# Patient Record
Sex: FEMALE | Race: WHITE | NOT HISPANIC OR LATINO | Employment: FULL TIME | URBAN - METROPOLITAN AREA
[De-identification: names, ages, dates, MRNs, and addresses within clinical notes are randomized per-mention and may not be internally consistent; named-entity substitution may affect disease eponyms.]

---

## 2022-02-18 LAB
HUMAN PAPILLOMAVIRUS (HPV): NORMAL
PAP RECOMMENDATION EXT: NORMAL
PAP SMEAR: NORMAL

## 2022-04-10 ENCOUNTER — HISTORICAL (OUTPATIENT)
Dept: ADMINISTRATIVE | Facility: HOSPITAL | Age: 32
End: 2022-04-10

## 2022-04-26 VITALS
WEIGHT: 130.94 LBS | BODY MASS INDEX: 19.84 KG/M2 | SYSTOLIC BLOOD PRESSURE: 110 MMHG | DIASTOLIC BLOOD PRESSURE: 60 MMHG | OXYGEN SATURATION: 99 % | HEIGHT: 68 IN

## 2024-03-09 PROBLEM — G43.009 MIGRAINE WITHOUT AURA AND WITHOUT STATUS MIGRAINOSUS, NOT INTRACTABLE: Status: ACTIVE | Noted: 2024-03-09

## 2024-03-09 PROBLEM — F90.0 ATTENTION DEFICIT HYPERACTIVITY DISORDER (ADHD), PREDOMINANTLY INATTENTIVE TYPE: Status: ACTIVE | Noted: 2019-07-08

## 2024-03-09 NOTE — PROGRESS NOTES
Patient ID: [id number]     Chief Complaint: Establish Care        HPI:     Jory Sosa is a 33 y.o. female here today Patient here to establish with PCP/Wellness visit- referred to clinic by  insurance program.  Interested in obtaining baseline labs.  Is interested in obtaining hormone levels.   1) Is managed by psychiatrist-for attention deficit disorder, anxiety, history of opioid abuse-doing well.   2) Has been evaluated by multiple Dermatologist regarding treatment for acne rosacea-continues to have significant problems-is a stressor.    Past Medical History:   Diagnosis Date    Anxiety 2013    Attention deficit hyperactivity disorder (ADHD), predominantly inattentive type 2019    +psych testing    Migraine without aura and without status migrainosus, not intractable 2024        History reviewed. No pertinent surgical history.     Social History     Tobacco Use    Smoking status: Never    Smokeless tobacco: Never   Substance Use Topics    Drug use: Never        Social History     Socioeconomic History    Marital status:     Number of children: 2        Current Outpatient Medications   Medication Instructions    azelaic acid (AZELEX) 15 % gel SMARTSI Gram(s) Topical Every Morning    buprenorphine HCL (SUBUTEX) 4 mg, Sublingual    buPROPion (WELLBUTRIN XL) 150 mg, Oral    clindamycin phosphate 1% (CLINDAGEL) 1 % gel Topical (Top), 2 times daily    cloNIDine (CATAPRES) 0.1-0.2 mg, Oral, Nightly PRN    dextroamphetamine-amphetamine (ADDERALL) 20 mg tablet 1 tablet, Oral, 2 times daily    levonorgestreL (MIRENA) 52 mg, Intrauterine    LINZESS 290 mcg, Oral    metronidazole 1% (METROGEL) 1 % Gel 1 application , Topical (Top)    mupirocin (BACTROBAN) 2 % ointment Topical (Top)    spironolactone (ALDACTONE) 50 mg, Oral, 2 times daily    SSD 1 % cream Topical (Top), 2 times daily       Review of patient's allergies indicates:  No Known Allergies     Patient Care Team:  Shirlene Eastman MD as  "PCP - General (Family Medicine)  Eddi Smith MD (Dermatology)  Sharon Kathleen PA as Physician Assistant (General Surgery)  Blayne Lugo MD (Dermatology)  Anna Marie Chiu MD as Consulting Physician (Obstetrics and Gynecology)     Subjective:     Review of Systems    12 point review of systems conducted, negative except as stated in the history of present illness. See HPI for details.      Objective:     Visit Vitals  /72   Pulse 98   Temp 97.9 °F (36.6 °C)   Ht 5' 7" (1.702 m)   Wt 54.3 kg (119 lb 9.6 oz)   SpO2 96%   BMI 18.73 kg/m²       Physical Exam    General: Alert and oriented, No acute distress.  Head: Normocephalic, Atraumatic, Acne.  Eye: Pupils are equal, round and reactive to light, Extraocular movements are intact, Sclera non-icteric.  Ears/Nose/Throat: Normal, Mucosa moist,Clear.  Neck/Thyroid: Supple, Non-tender, No palpable thyromegaly or thyroid nodule, No lymphadenopathy, No JVD, Full range of motion.  Respiratory: Clear to auscultation bilaterally; No wheezes, rales or rhonchi,Non-labored respirations, Symmetrical chest wall expansion.  Cardiovascular: Regular rate and rhythm, S1/S2 normal, No murmurs, rubs or gallops.  Gastrointestinal: Soft, Non-tender, Non-distended, Normal bowel sounds, No palpable organomegaly.  Musculoskeletal: Normal range of motion.  Integumentary: Warm, Dry, Intact, No suspicious lesions or rashes.  Extremities: No clubbing, cyanosis or edema  Neurologic: No focal deficits, Cranial Nerves II-XII are grossly intact, Motor strength normal upper and lower extremities, Sensory exam intact.  Psychiatric: Normal interaction, Coherent speech, Euthymic mood, Appropriate affect       Assessment:       ICD-10-CM ICD-9-CM   1. Wellness examination  Z00.00 V70.0   2. Attention deficit hyperactivity disorder (ADHD), predominantly inattentive type  F90.0 314.00   3. Anxiety  F41.9 300.00   4. Migraine without aura and without status migrainosus, not " "intractable  G43.009 346.10   5. Acne, unspecified acne type  L70.9 706.1   6. Fatigue, unspecified type  R53.83 780.79        Plan:       Cervical Cancer Screening - Pap Up to date     Breast Cancer Screening - Mammogram to start at age 40    Colon Cancer Screening - Colon cancer screening to start at age 45     Eye Exam - Recommend annually.    Dental Exam - Recommend biannual exams.     Vaccinations - Immunization guidelines reviewed with the patient.  Encourage patient to prevent disease through immunizations.      There is no immunization history on file for this patient.     1. Wellness examination  Wellness: Five Rules Reviewed: Water/Nutrition-Real Food, Limit Fast Food/ Exercise 20-30 minutes most days of the week/ Mental health- "Is your work a calling or paycheck" Define 'What is your purpose-what matters to you' Stress- Is an Individual Response- Therefore Can be Controlled by the Individual. Meditation/ Immunizations/Multivitamin use-Vitamin D3/ Screenings -patient would like referral to new gynecologist.  - CBC Auto Differential; Future  - Comprehensive Metabolic Panel; Future  - Lipid Panel; Future  - TSH; Future  - Hemoglobin A1C; Future  - Urinalysis; Future  - Hepatitis C Antibody; Future  - HIV 1/2 Ag/Ab (4th Gen); Future  - Ambulatory referral/consult to Obstetrics / Gynecology; Future    2. Attention deficit hyperactivity disorder (ADHD), predominantly inattentive type  Patient is comanage with psychiatrist -no acute modifications or recommendations at this time.     3. Anxiety  Patient is comanage with psychiatrist -no acute modifications or recommendations at this time.     4. Migraine without aura and without status migrainosus, not intractable  Patient is currently stable.  No acute modifications recommended.  Continue to monitor    5. Acne, unspecified acne type  Patient referred to dermatologist for further treatment options.   - Ambulatory referral/consult to Dermatology; Future    6. " Fatigue, unspecified type  Check studies management based upon results.  Pathophysiology/treatment/dangers discussed.   - Progesterone; Future  - Luteinizing Hormone; Future  - Follicle Stimulating Hormone; Future  - Estradiol; Future      The patient's Health Maintenance was reviewed and the following appears to be due at this time:   Health Maintenance Due   Topic Date Due    Hepatitis C Screening  Never done    Cervical Cancer Screening  Never done    Lipid Panel  Never done    COVID-19 Vaccine (1) Never done    HIV Screening  Never done    TETANUS VACCINE  Never done    Influenza Vaccine (1) 09/01/2023         Follow up in about 2 weeks (around 3/28/2024). In addition to their scheduled follow up, the patient has also been instructed to follow up on as needed basis.     Future Appointments   Date Time Provider Department Center   3/27/2024 11:15 AM [facility]

## 2024-03-14 ENCOUNTER — OFFICE VISIT (OUTPATIENT)
Dept: FAMILY MEDICINE | Facility: CLINIC | Age: 34
End: 2024-03-14
Payer: COMMERCIAL

## 2024-03-14 VITALS
WEIGHT: 119.63 LBS | HEIGHT: 67 IN | HEART RATE: 98 BPM | TEMPERATURE: 98 F | DIASTOLIC BLOOD PRESSURE: 72 MMHG | OXYGEN SATURATION: 96 % | BODY MASS INDEX: 18.78 KG/M2 | SYSTOLIC BLOOD PRESSURE: 102 MMHG

## 2024-03-14 DIAGNOSIS — R53.83 FATIGUE, UNSPECIFIED TYPE: ICD-10-CM

## 2024-03-14 DIAGNOSIS — G43.009 MIGRAINE WITHOUT AURA AND WITHOUT STATUS MIGRAINOSUS, NOT INTRACTABLE: ICD-10-CM

## 2024-03-14 DIAGNOSIS — F41.9 ANXIETY: ICD-10-CM

## 2024-03-14 DIAGNOSIS — Z00.00 WELLNESS EXAMINATION: Primary | ICD-10-CM

## 2024-03-14 DIAGNOSIS — L70.9 ACNE, UNSPECIFIED ACNE TYPE: ICD-10-CM

## 2024-03-14 DIAGNOSIS — F90.0 ATTENTION DEFICIT HYPERACTIVITY DISORDER (ADHD), PREDOMINANTLY INATTENTIVE TYPE: ICD-10-CM

## 2024-03-14 PROCEDURE — 1159F MED LIST DOCD IN RCRD: CPT | Mod: CPTII,,, | Performed by: FAMILY MEDICINE

## 2024-03-14 PROCEDURE — 1160F RVW MEDS BY RX/DR IN RCRD: CPT | Mod: CPTII,,, | Performed by: FAMILY MEDICINE

## 2024-03-14 PROCEDURE — 3078F DIAST BP <80 MM HG: CPT | Mod: CPTII,,, | Performed by: FAMILY MEDICINE

## 2024-03-14 PROCEDURE — 3008F BODY MASS INDEX DOCD: CPT | Mod: CPTII,,, | Performed by: FAMILY MEDICINE

## 2024-03-14 PROCEDURE — 99385 PREV VISIT NEW AGE 18-39: CPT | Mod: ,,, | Performed by: FAMILY MEDICINE

## 2024-03-14 PROCEDURE — 3074F SYST BP LT 130 MM HG: CPT | Mod: CPTII,,, | Performed by: FAMILY MEDICINE

## 2024-03-14 RX ORDER — SPIRONOLACTONE 50 MG/1
50 TABLET, FILM COATED ORAL 2 TIMES DAILY
COMMUNITY
Start: 2024-02-20

## 2024-03-14 RX ORDER — BUPRENORPHINE 2 MG/1
4 TABLET SUBLINGUAL
COMMUNITY
Start: 2024-02-27

## 2024-03-14 RX ORDER — LINACLOTIDE 290 UG/1
290 CAPSULE, GELATIN COATED ORAL
COMMUNITY
Start: 2024-02-05

## 2024-03-14 RX ORDER — CLONIDINE HYDROCHLORIDE 0.1 MG/1
0.1-0.2 TABLET ORAL NIGHTLY PRN
COMMUNITY
Start: 2024-02-22

## 2024-03-14 RX ORDER — MUPIROCIN 20 MG/G
OINTMENT TOPICAL
COMMUNITY
Start: 2024-03-03

## 2024-03-14 RX ORDER — CLINDAMYCIN PHOSPHATE 10 MG/G
GEL TOPICAL 2 TIMES DAILY
COMMUNITY
Start: 2023-12-26

## 2024-03-14 RX ORDER — AZELAIC ACID 0.15 G/G
GEL TOPICAL
COMMUNITY
Start: 2024-03-03

## 2024-03-14 RX ORDER — SILVER SULFADIAZINE 10 G/1000G
CREAM TOPICAL 2 TIMES DAILY
COMMUNITY
Start: 2024-03-03

## 2024-03-14 RX ORDER — METRONIDAZOLE 10 MG/G
1 GEL TOPICAL
COMMUNITY
Start: 2024-01-18

## 2024-03-14 RX ORDER — BUPROPION HYDROCHLORIDE 150 MG/1
150 TABLET ORAL
COMMUNITY

## 2024-03-14 RX ORDER — DEXTROAMPHETAMINE SACCHARATE, AMPHETAMINE ASPARTATE, DEXTROAMPHETAMINE SULFATE AND AMPHETAMINE SULFATE 5; 5; 5; 5 MG/1; MG/1; MG/1; MG/1
1 TABLET ORAL 2 TIMES DAILY
COMMUNITY

## 2024-03-15 ENCOUNTER — PATIENT OUTREACH (OUTPATIENT)
Dept: ADMINISTRATIVE | Facility: HOSPITAL | Age: 34
End: 2024-03-15
Payer: COMMERCIAL

## 2024-03-19 NOTE — PROGRESS NOTES
Health Maintenance Topic(s) Outreach Outcomes & Actions Taken:    Cervical Cancer Screening - Outreach Outcomes & Actions Taken  : External Records Uploaded & Care Team Updated if Applicable

## 2024-03-22 NOTE — PROGRESS NOTES
Patient ID: [id number]     Chief Complaint: Discuss Lab Results       HPI:     Jory Sosa is a 33 y.o. female here today for  follow up discuss lab results:   1) Is going to continue to see psychiatrist for management of anxiety and ADD.      Past Medical History:   Diagnosis Date    Anxiety 2013    Attention deficit hyperactivity disorder (ADHD), predominantly inattentive type 2019    +psych testing    Migraine without aura and without status migrainosus, not intractable 2024        History reviewed. No pertinent surgical history.     Social History     Tobacco Use    Smoking status: Never    Smokeless tobacco: Never   Substance Use Topics    Drug use: Never        Social History     Socioeconomic History    Marital status:     Number of children: 2        Current Outpatient Medications   Medication Instructions    azelaic acid (AZELEX) 15 % gel SMARTSI Gram(s) Topical Every Morning    buprenorphine HCL (SUBUTEX) 4 mg, Sublingual    buPROPion (WELLBUTRIN XL) 150 mg, Oral    clindamycin phosphate 1% (CLINDAGEL) 1 % gel Topical (Top), 2 times daily    cloNIDine (CATAPRES) 0.1-0.2 mg, Oral, Nightly PRN    dextroamphetamine-amphetamine (ADDERALL) 20 mg tablet 1 tablet, Oral, 2 times daily    levonorgestreL (MIRENA) 52 mg, Intrauterine    LINZESS 290 mcg, Oral    metronidazole 1% (METROGEL) 1 % Gel 1 application , Topical (Top)    mupirocin (BACTROBAN) 2 % ointment Topical (Top)    spironolactone (ALDACTONE) 50 mg, Oral, 2 times daily    SSD 1 % cream Topical (Top), 2 times daily       Review of patient's allergies indicates:  No Known Allergies     Patient Care Team:  Shirlene Eastman MD as PCP - General (Family Medicine)  Eddi Smith MD (Dermatology)  Sharon Kathleen PA as Physician Assistant (General Surgery)  Blayne Lugo MD (Dermatology)  Anna Marie Chiu MD as Consulting Physician (Obstetrics and Gynecology)       Subjective:     Review of Systems    12 point review  "of systems conducted, negative except as stated in the history of present illness. See HPI for details.      Objective:     Visit Vitals  /72   Pulse 96   Temp 98 °F (36.7 °C)   Ht 5' 7" (1.702 m)   Wt 54.3 kg (119 lb 9.6 oz)   SpO2 100%   BMI 18.73 kg/m²       Physical Exam    General: Alert and oriented, No acute distress.  Head: Normocephalic, Atraumatic.  Eye: Pupils are equal, round and reactive to light, Extraocular movements are intact, Sclera non-icteric.  Ears/Nose/Throat: Normal, Mucosa moist,Clear.  Neck/Thyroid: Supple, Non-tender  Respiratory: Clear to auscultation bilaterally  Cardiovascular: Regular rate and rhythm, S1/S2 normal  Gastrointestinal: Soft, Non-tender, Non-distended, Normal bowel sounds, No palpable organomegaly.  Musculoskeletal: Normal range of motion.  Integumentary: Warm, Dry  Extremities: No clubbing, cyanosis or edema  Neurologic: No focal deficits, Cranial Nerves II-XII are grossly intact  Psychiatric: Normal interaction, Coherent speech       Assessment:       ICD-10-CM ICD-9-CM   1. Encounter to discuss test results  Z71.2 V65.49   2. Attention deficit hyperactivity disorder (ADHD), predominantly inattentive type  F90.0 314.00   3. Anxiety  F41.9 300.00   4. Migraine without aura and without status migrainosus, not intractable  G43.009 346.10   5. Wellness examination  Z00.00 V70.0        Plan:     1. Encounter to discuss test results  Abnormal Lab Results: CMP/CBC/HGBA1C/TSH/FLP/UA/ Hepatitis-C/HIV. Discussed.     2. Attention deficit hyperactivity disorder (ADHD), predominantly inattentive type  Patient is currently stable.  No acute modifications recommended.  Continue with current treatment-per recommendations of psychiatrist.     3. Anxiety  Patient is comanage with psychiatrist -no acute modifications or recommendations at this time.    4. Migraine without aura and without status migrainosus, not intractable  Patient is currently stable.  No acute modifications " recommended.  Continue with current treatment.     5. Wellness examination  Patient given lab orders to be completed before wellness visit.   - CBC Auto Differential; Future  - Comprehensive Metabolic Panel; Future  - Lipid Panel; Future  - TSH; Future  - Hemoglobin A1C; Future  - Urinalysis; Future    Follow up in about 1 year (around 3/27/2025) for Annual Wellness. In addition to their scheduled follow up, the patient has also been instructed to follow up on as needed basis.     Future Appointments   Date Time Provider Department Center   3/28/2025  9:00 AM Shirlene Eastman MD [facility] KAREN Eastman MD

## 2024-03-25 ENCOUNTER — LAB VISIT (OUTPATIENT)
Dept: LAB | Facility: HOSPITAL | Age: 34
End: 2024-03-25
Attending: FAMILY MEDICINE
Payer: COMMERCIAL

## 2024-03-25 DIAGNOSIS — Z00.00 WELLNESS EXAMINATION: ICD-10-CM

## 2024-03-25 DIAGNOSIS — R53.83 FATIGUE, UNSPECIFIED TYPE: ICD-10-CM

## 2024-03-25 LAB
ALBUMIN SERPL-MCNC: 4.2 G/DL (ref 3.5–5)
ALBUMIN/GLOB SERPL: 1.7 RATIO (ref 1.1–2)
ALP SERPL-CCNC: 93 UNIT/L (ref 40–150)
ALT SERPL-CCNC: 23 UNIT/L (ref 0–55)
APPEARANCE UR: ABNORMAL
AST SERPL-CCNC: 32 UNIT/L (ref 5–34)
BACTERIA #/AREA URNS AUTO: ABNORMAL /HPF
BASOPHILS # BLD AUTO: 0.07 X10(3)/MCL
BASOPHILS NFR BLD AUTO: 1.3 %
BILIRUB SERPL-MCNC: 0.4 MG/DL
BILIRUB UR QL STRIP.AUTO: NEGATIVE
BUN SERPL-MCNC: 9.5 MG/DL (ref 7–18.7)
CALCIUM SERPL-MCNC: 9.9 MG/DL (ref 8.4–10.2)
CHLORIDE SERPL-SCNC: 104 MMOL/L (ref 98–107)
CHOLEST SERPL-MCNC: 112 MG/DL
CHOLEST/HDLC SERPL: 2 {RATIO} (ref 0–5)
CO2 SERPL-SCNC: 25 MMOL/L (ref 22–29)
COLOR UR AUTO: YELLOW
CREAT SERPL-MCNC: 0.76 MG/DL (ref 0.55–1.02)
EOSINOPHIL # BLD AUTO: 0.15 X10(3)/MCL (ref 0–0.9)
EOSINOPHIL NFR BLD AUTO: 2.8 %
ERYTHROCYTE [DISTWIDTH] IN BLOOD BY AUTOMATED COUNT: 12.3 % (ref 11.5–17)
EST. AVERAGE GLUCOSE BLD GHB EST-MCNC: 96.8 MG/DL
ESTRADIOL SERPL HS-MCNC: 56 PG/ML
FSH SERPL-ACNC: 3.15 MIU/ML
GFR SERPLBLD CREATININE-BSD FMLA CKD-EPI: >60 MLS/MIN/1.73/M2
GLOBULIN SER-MCNC: 2.5 GM/DL (ref 2.4–3.5)
GLUCOSE SERPL-MCNC: 87 MG/DL (ref 74–100)
GLUCOSE UR QL STRIP.AUTO: NORMAL
HBA1C MFR BLD: 5 %
HCT VFR BLD AUTO: 37.5 % (ref 37–47)
HDLC SERPL-MCNC: 51 MG/DL (ref 35–60)
HGB BLD-MCNC: 12.3 G/DL (ref 12–16)
HYALINE CASTS #/AREA URNS LPF: >20 /LPF
IMM GRANULOCYTES # BLD AUTO: 0.02 X10(3)/MCL (ref 0–0.04)
IMM GRANULOCYTES NFR BLD AUTO: 0.4 %
KETONES UR QL STRIP.AUTO: ABNORMAL
LDLC SERPL CALC-MCNC: 52 MG/DL (ref 50–140)
LEUKOCYTE ESTERASE UR QL STRIP.AUTO: 500
LH SERPL-ACNC: 1.04 MIU/ML
LYMPHOCYTES # BLD AUTO: 2.31 X10(3)/MCL (ref 0.6–4.6)
LYMPHOCYTES NFR BLD AUTO: 43.6 %
MCH RBC QN AUTO: 28.7 PG (ref 27–31)
MCHC RBC AUTO-ENTMCNC: 32.8 G/DL (ref 33–36)
MCV RBC AUTO: 87.4 FL (ref 80–94)
MONOCYTES # BLD AUTO: 0.46 X10(3)/MCL (ref 0.1–1.3)
MONOCYTES NFR BLD AUTO: 8.7 %
MUCOUS THREADS URNS QL MICRO: ABNORMAL /LPF
NEUTROPHILS # BLD AUTO: 2.29 X10(3)/MCL (ref 2.1–9.2)
NEUTROPHILS NFR BLD AUTO: 43.2 %
NITRITE UR QL STRIP.AUTO: NEGATIVE
NRBC BLD AUTO-RTO: 0 %
PH UR STRIP.AUTO: 5.5 [PH]
PLATELET # BLD AUTO: 215 X10(3)/MCL (ref 130–400)
PMV BLD AUTO: 11.9 FL (ref 7.4–10.4)
POTASSIUM SERPL-SCNC: 4.5 MMOL/L (ref 3.5–5.1)
PROGEST SERPL-MCNC: 3.4 NG/ML
PROT SERPL-MCNC: 6.7 GM/DL (ref 6.4–8.3)
PROT UR QL STRIP.AUTO: ABNORMAL
RBC # BLD AUTO: 4.29 X10(6)/MCL (ref 4.2–5.4)
RBC #/AREA URNS AUTO: ABNORMAL /HPF
RBC UR QL AUTO: NEGATIVE
SODIUM SERPL-SCNC: 140 MMOL/L (ref 136–145)
SP GR UR STRIP.AUTO: 1.03 (ref 1–1.03)
SQUAMOUS #/AREA URNS LPF: ABNORMAL /HPF
TRIGL SERPL-MCNC: 47 MG/DL (ref 37–140)
TSH SERPL-ACNC: 4.24 UIU/ML (ref 0.35–4.94)
UROBILINOGEN UR STRIP-ACNC: NORMAL
VLDLC SERPL CALC-MCNC: 9 MG/DL
WBC # SPEC AUTO: 5.3 X10(3)/MCL (ref 4.5–11.5)
WBC #/AREA URNS AUTO: ABNORMAL /HPF

## 2024-03-25 PROCEDURE — 80053 COMPREHEN METABOLIC PANEL: CPT

## 2024-03-25 PROCEDURE — 83036 HEMOGLOBIN GLYCOSYLATED A1C: CPT

## 2024-03-25 PROCEDURE — 36415 COLL VENOUS BLD VENIPUNCTURE: CPT

## 2024-03-25 PROCEDURE — 86803 HEPATITIS C AB TEST: CPT

## 2024-03-25 PROCEDURE — 87389 HIV-1 AG W/HIV-1&-2 AB AG IA: CPT

## 2024-03-25 PROCEDURE — 82670 ASSAY OF TOTAL ESTRADIOL: CPT

## 2024-03-25 PROCEDURE — 81001 URINALYSIS AUTO W/SCOPE: CPT

## 2024-03-25 PROCEDURE — 84443 ASSAY THYROID STIM HORMONE: CPT

## 2024-03-25 PROCEDURE — 80061 LIPID PANEL: CPT

## 2024-03-25 PROCEDURE — 84144 ASSAY OF PROGESTERONE: CPT

## 2024-03-25 PROCEDURE — 85025 COMPLETE CBC W/AUTO DIFF WBC: CPT

## 2024-03-25 PROCEDURE — 83002 ASSAY OF GONADOTROPIN (LH): CPT

## 2024-03-25 PROCEDURE — 83001 ASSAY OF GONADOTROPIN (FSH): CPT

## 2024-03-25 PROCEDURE — 87086 URINE CULTURE/COLONY COUNT: CPT

## 2024-03-26 LAB
HCV AB SERPL QL IA: NONREACTIVE
HIV 1+2 AB+HIV1 P24 AG SERPL QL IA: NONREACTIVE

## 2024-03-27 ENCOUNTER — OFFICE VISIT (OUTPATIENT)
Dept: FAMILY MEDICINE | Facility: CLINIC | Age: 34
End: 2024-03-27
Payer: COMMERCIAL

## 2024-03-27 VITALS
DIASTOLIC BLOOD PRESSURE: 72 MMHG | HEIGHT: 67 IN | OXYGEN SATURATION: 100 % | WEIGHT: 119.63 LBS | BODY MASS INDEX: 18.78 KG/M2 | SYSTOLIC BLOOD PRESSURE: 108 MMHG | HEART RATE: 96 BPM | TEMPERATURE: 98 F

## 2024-03-27 DIAGNOSIS — F41.9 ANXIETY: ICD-10-CM

## 2024-03-27 DIAGNOSIS — Z00.00 WELLNESS EXAMINATION: ICD-10-CM

## 2024-03-27 DIAGNOSIS — Z71.2 ENCOUNTER TO DISCUSS TEST RESULTS: Primary | ICD-10-CM

## 2024-03-27 DIAGNOSIS — G43.009 MIGRAINE WITHOUT AURA AND WITHOUT STATUS MIGRAINOSUS, NOT INTRACTABLE: ICD-10-CM

## 2024-03-27 DIAGNOSIS — F90.0 ATTENTION DEFICIT HYPERACTIVITY DISORDER (ADHD), PREDOMINANTLY INATTENTIVE TYPE: ICD-10-CM

## 2024-03-27 LAB — BACTERIA UR CULT: NO GROWTH

## 2024-03-27 PROCEDURE — 99213 OFFICE O/P EST LOW 20 MIN: CPT | Mod: ,,, | Performed by: FAMILY MEDICINE

## 2024-03-27 PROCEDURE — 1160F RVW MEDS BY RX/DR IN RCRD: CPT | Mod: CPTII,,, | Performed by: FAMILY MEDICINE

## 2024-03-27 PROCEDURE — 3044F HG A1C LEVEL LT 7.0%: CPT | Mod: CPTII,,, | Performed by: FAMILY MEDICINE

## 2024-03-27 PROCEDURE — 3078F DIAST BP <80 MM HG: CPT | Mod: CPTII,,, | Performed by: FAMILY MEDICINE

## 2024-03-27 PROCEDURE — 3008F BODY MASS INDEX DOCD: CPT | Mod: CPTII,,, | Performed by: FAMILY MEDICINE

## 2024-03-27 PROCEDURE — 3074F SYST BP LT 130 MM HG: CPT | Mod: CPTII,,, | Performed by: FAMILY MEDICINE

## 2024-03-27 PROCEDURE — 1159F MED LIST DOCD IN RCRD: CPT | Mod: CPTII,,, | Performed by: FAMILY MEDICINE

## 2024-03-27 RX ORDER — DOXYCYCLINE 40 MG/1
40 CAPSULE ORAL
COMMUNITY
Start: 2024-03-25 | End: 2024-03-27